# Patient Record
Sex: MALE | Race: WHITE | NOT HISPANIC OR LATINO | Employment: UNEMPLOYED | RURAL
[De-identification: names, ages, dates, MRNs, and addresses within clinical notes are randomized per-mention and may not be internally consistent; named-entity substitution may affect disease eponyms.]

---

## 2022-03-13 ENCOUNTER — HOSPITAL ENCOUNTER (EMERGENCY)
Facility: HOSPITAL | Age: 72
Discharge: OTHER INSTITUTION | End: 2022-03-13
Payer: COMMERCIAL

## 2022-03-13 VITALS
BODY MASS INDEX: 24.43 KG/M2 | DIASTOLIC BLOOD PRESSURE: 73 MMHG | SYSTOLIC BLOOD PRESSURE: 136 MMHG | WEIGHT: 152 LBS | HEART RATE: 74 BPM | TEMPERATURE: 98 F | RESPIRATION RATE: 16 BRPM | OXYGEN SATURATION: 99 % | HEIGHT: 66 IN

## 2022-03-13 DIAGNOSIS — I69.398 ALTERATION OF SENSATION AS LATE EFFECT OF CEREBROVASCULAR ACCIDENT (CVA): ICD-10-CM

## 2022-03-13 DIAGNOSIS — I63.9 CVA (CEREBRAL VASCULAR ACCIDENT): Primary | ICD-10-CM

## 2022-03-13 DIAGNOSIS — R20.9 ALTERATION OF SENSATION AS LATE EFFECT OF CEREBROVASCULAR ACCIDENT (CVA): ICD-10-CM

## 2022-03-13 LAB
ANION GAP SERPL CALCULATED.3IONS-SCNC: 13 MMOL/L (ref 7–16)
BASOPHILS # BLD AUTO: 0.01 K/UL (ref 0–0.2)
BASOPHILS NFR BLD AUTO: 0.1 % (ref 0–1)
BUN SERPL-MCNC: 29 MG/DL (ref 7–18)
BUN/CREAT SERPL: 22 (ref 6–20)
CALCIUM SERPL-MCNC: 7.9 MG/DL (ref 8.5–10.1)
CHLORIDE SERPL-SCNC: 100 MMOL/L (ref 98–107)
CO2 SERPL-SCNC: 27 MMOL/L (ref 21–32)
CREAT SERPL-MCNC: 1.29 MG/DL (ref 0.7–1.3)
DIFFERENTIAL METHOD BLD: ABNORMAL
EOSINOPHIL # BLD AUTO: 0.15 K/UL (ref 0–0.5)
EOSINOPHIL NFR BLD AUTO: 2.1 % (ref 1–4)
ERYTHROCYTE [DISTWIDTH] IN BLOOD BY AUTOMATED COUNT: 16.1 % (ref 11.5–14.5)
GLUCOSE SERPL-MCNC: 92 MG/DL (ref 74–106)
HCT VFR BLD AUTO: 44 % (ref 40–54)
HGB BLD-MCNC: 14.4 G/DL (ref 13.5–18)
INR BLD: 1.05 (ref 0.9–1.1)
LYMPHOCYTES # BLD AUTO: 0.56 K/UL (ref 1–4.8)
LYMPHOCYTES NFR BLD AUTO: 7.9 % (ref 27–41)
MCH RBC QN AUTO: 27.3 PG (ref 27–31)
MCHC RBC AUTO-ENTMCNC: 32.7 G/DL (ref 32–36)
MCV RBC AUTO: 83.3 FL (ref 80–96)
MONOCYTES # BLD AUTO: 0.27 K/UL (ref 0–0.8)
MONOCYTES NFR BLD AUTO: 3.8 % (ref 2–6)
MPC BLD CALC-MCNC: 9.9 FL (ref 9.4–12.4)
NEUTROPHILS # BLD AUTO: 6.09 K/UL (ref 1.8–7.7)
NEUTROPHILS NFR BLD AUTO: 86.1 % (ref 53–65)
PLATELET # BLD AUTO: 240 K/UL (ref 150–400)
POTASSIUM SERPL-SCNC: 4 MMOL/L (ref 3.5–5.1)
PROTHROMBIN TIME: 13.8 SECONDS (ref 11.7–14.7)
RBC # BLD AUTO: 5.28 M/UL (ref 4.6–6.2)
SODIUM SERPL-SCNC: 136 MMOL/L (ref 136–145)
WBC # BLD AUTO: 7.08 K/UL (ref 4.5–11)

## 2022-03-13 PROCEDURE — 36415 COLL VENOUS BLD VENIPUNCTURE: CPT | Performed by: NURSE PRACTITIONER

## 2022-03-13 PROCEDURE — 25000003 PHARM REV CODE 250: Performed by: NURSE PRACTITIONER

## 2022-03-13 PROCEDURE — 93010 EKG 12-LEAD: ICD-10-PCS | Mod: ,,, | Performed by: INTERNAL MEDICINE

## 2022-03-13 PROCEDURE — 99285 EMERGENCY DEPT VISIT HI MDM: CPT | Mod: 25

## 2022-03-13 PROCEDURE — 93010 ELECTROCARDIOGRAM REPORT: CPT | Mod: ,,, | Performed by: INTERNAL MEDICINE

## 2022-03-13 PROCEDURE — 93005 ELECTROCARDIOGRAM TRACING: CPT

## 2022-03-13 PROCEDURE — 99285 EMERGENCY DEPT VISIT HI MDM: CPT | Mod: GF | Performed by: NURSE PRACTITIONER

## 2022-03-13 PROCEDURE — 80048 BASIC METABOLIC PNL TOTAL CA: CPT | Performed by: NURSE PRACTITIONER

## 2022-03-13 PROCEDURE — 85610 PROTHROMBIN TIME: CPT | Performed by: NURSE PRACTITIONER

## 2022-03-13 PROCEDURE — 85025 COMPLETE CBC W/AUTO DIFF WBC: CPT | Performed by: NURSE PRACTITIONER

## 2022-03-13 RX ORDER — DULOXETIN HYDROCHLORIDE 30 MG/1
30 CAPSULE, DELAYED RELEASE ORAL EVERY MORNING
COMMUNITY
Start: 2022-03-10

## 2022-03-13 RX ORDER — ASPIRIN 325 MG
325 TABLET ORAL
Status: COMPLETED | OUTPATIENT
Start: 2022-03-13 | End: 2022-03-13

## 2022-03-13 RX ORDER — GABAPENTIN 300 MG/1
300 CAPSULE ORAL 2 TIMES DAILY
COMMUNITY
Start: 2022-03-02

## 2022-03-13 RX ORDER — AMLODIPINE BESYLATE 10 MG/1
10 TABLET ORAL DAILY
COMMUNITY
Start: 2021-12-29 | End: 2023-02-07 | Stop reason: DRUGHIGH

## 2022-03-13 RX ORDER — TAMSULOSIN HYDROCHLORIDE 0.4 MG/1
1 CAPSULE ORAL NIGHTLY
COMMUNITY
Start: 2022-02-21

## 2022-03-13 RX ORDER — CLOPIDOGREL BISULFATE 75 MG/1
75 TABLET ORAL DAILY
COMMUNITY
Start: 2022-01-11

## 2022-03-13 RX ORDER — ATORVASTATIN CALCIUM 80 MG/1
80 TABLET, FILM COATED ORAL
Status: COMPLETED | OUTPATIENT
Start: 2022-03-13 | End: 2022-03-13

## 2022-03-13 RX ORDER — LOSARTAN POTASSIUM 50 MG/1
50 TABLET ORAL DAILY
COMMUNITY
Start: 2021-12-31

## 2022-03-13 RX ORDER — UMECLIDINIUM BROMIDE AND VILANTEROL TRIFENATATE 62.5; 25 UG/1; UG/1
1 POWDER RESPIRATORY (INHALATION) DAILY
COMMUNITY
Start: 2022-02-28

## 2022-03-13 RX ORDER — LACOSAMIDE 100 MG/1
100 TABLET, FILM COATED ORAL 2 TIMES DAILY
COMMUNITY
Start: 2021-12-21 | End: 2023-02-07 | Stop reason: DRUGHIGH

## 2022-03-13 RX ORDER — OMEPRAZOLE 20 MG/1
20 CAPSULE, DELAYED RELEASE ORAL DAILY
COMMUNITY
Start: 2022-03-03

## 2022-03-13 RX ADMIN — ASPIRIN 325 MG ORAL TABLET 325 MG: 325 PILL ORAL at 01:03

## 2022-03-13 RX ADMIN — ATORVASTATIN CALCIUM 80 MG: 80 TABLET, FILM COATED ORAL at 01:03

## 2022-03-13 NOTE — ED PROVIDER NOTES
Encounter Date: 3/13/2022       History     Chief Complaint   Patient presents with    Back Pain    Leg Pain    Tingling     Pt c/o chronic leg pain, and lower back pain, also reports numbness on right side after waking this AM, reports numbness has almost subsided but still feels a tingling sensation in right hand and shoulder, reports he went to bed at 2000 last PM     70 y/o WM presents pov with c/o chronic low back/leg pain. Also c/o right side numbness/tingling this AM. Previous CVA 4 years ago affecting the right side with no residual deficit.        Review of patient's allergies indicates:  No Known Allergies  Past Medical History:   Diagnosis Date    Anticoagulant long-term use     Arthritis     Coronary artery disease     Hypertension     Stroke      History reviewed. No pertinent surgical history.  History reviewed. No pertinent family history.  Social History     Tobacco Use    Smoking status: Current Every Day Smoker     Packs/day: 1.00     Years: 60.00     Pack years: 60.00     Types: Cigarettes    Smokeless tobacco: Never Used   Substance Use Topics    Alcohol use: Yes     Comment: socially    Drug use: Never     Review of Systems   Constitutional: Negative.    HENT: Negative.    Eyes: Negative.    Respiratory: Negative for apnea, cough, choking, chest tightness, shortness of breath, wheezing and stridor.    Cardiovascular: Negative for chest pain, palpitations and leg swelling.   Gastrointestinal: Negative.    Endocrine: Negative.    Genitourinary: Negative.    Musculoskeletal: Negative.    Skin: Negative.    Neurological: Positive for numbness. Negative for dizziness, tremors, seizures, syncope, facial asymmetry, speech difficulty, weakness, light-headedness and headaches.   Psychiatric/Behavioral: Negative.        Physical Exam     Initial Vitals [03/13/22 1119]   BP Pulse Resp Temp SpO2   131/69 83 18 98.2 °F (36.8 °C) 98 %      MAP       --         Physical Exam    Constitutional: He  appears well-developed and well-nourished.   HENT:   Head: Normocephalic and atraumatic.   Eyes: Conjunctivae and EOM are normal. Pupils are equal, round, and reactive to light.   Neck: Neck supple.   Normal range of motion.  Cardiovascular: Normal rate, regular rhythm, normal heart sounds and intact distal pulses. Exam reveals no gallop and no friction rub.    No murmur heard.  Pulmonary/Chest: Breath sounds normal. No respiratory distress. He has no wheezes. He has no rhonchi. He has no rales. He exhibits no tenderness.   Musculoskeletal:      Cervical back: Normal range of motion and neck supple.     Lymphadenopathy:     He has no cervical adenopathy.   Neurological: He is alert and oriented to person, place, and time. He has normal strength. A sensory deficit is present. No cranial nerve deficit. He displays a negative Romberg sign. GCS eye subscore is 4. GCS verbal subscore is 5. GCS motor subscore is 6.         Medical Screening Exam   See Full Note    ED Course   Procedures  Labs Reviewed   BASIC METABOLIC PANEL - Abnormal; Notable for the following components:       Result Value    BUN 29 (*)     BUN/Creatinine Ratio 22 (*)     Calcium 7.9 (*)     eGFR 58 (*)     All other components within normal limits   CBC WITH DIFFERENTIAL - Abnormal; Notable for the following components:    RDW 16.1 (*)     Neutrophils % 86.1 (*)     Lymphocytes % 7.9 (*)     Lymphocytes, Absolute 0.56 (*)     All other components within normal limits   PROTIME-INR - Normal   CBC W/ AUTO DIFFERENTIAL    Narrative:     The following orders were created for panel order CBC auto differential.  Procedure                               Abnormality         Status                     ---------                               -----------         ------                     CBC with Differential[[id number]]        Abnormal            Final result               Manual Differential[[id number]]                              Final result                  Please view results for these tests on the individual orders.   MANUAL DIFFERENTIAL     EKG Readings: (Independently Interpreted)   Rhythm: Normal Sinus Rhythm. Heart Rate: 75. Ectopy: PACs. ST Segments: Normal ST Segments. T Waves: Normal. Axis: Left Axis Deviation. Clinical Impression: Normal Sinus Rhythm with PACs     ECG Results          EKG 12-lead (In process)  Result time 03/13/22 13:35:03    In process by Interface, Lab In [facility] (03/13/22 13:35:03)                 Narrative:    Test Reason : I63.9,    Vent. Rate : 075 BPM     Atrial Rate : 075 BPM     P-R Int : 162 ms          QRS Dur : 070 ms      QT Int : 374 ms       P-R-T Axes : 026 -43 054 degrees     QTc Int : 417 ms    Sinus rhythm with Premature atrial complexes  Left axis deviation  Abnormal ECG  No previous ECGs available    Referred By: [email or web address]   SELF           Confirmed By:                   In process by Interface, Lab In [facility] (03/13/22 13:33:45)                 Narrative:    Test Reason : I63.9,    Vent. Rate : 075 BPM     Atrial Rate : 075 BPM     P-R Int : 162 ms          QRS Dur : 070 ms      QT Int : 374 ms       P-R-T Axes : 026 -43 054 degrees     QTc Int : 417 ms    Sinus rhythm with Premature atrial complexes  Left axis deviation  Abnormal ECG  No previous ECGs available    Referred By: [email or web address]   SELF           Confirmed By:                             Imaging Results          CT Head Without Contrast (Final result)  Result time 03/13/22 13:08:17    Final result by Andrea Rivera MD (03/13/22 13:08:17)                 Impression:      1. No acute intracranial hemorrhage.    2. Focal hypodensity left centrum semiovale and periventricular white matter of the left frontal lobe may represent focal sequela of chronic microvascular ischemia or superimposed acute lacunar infarct.  Correlate with deficits and consider further evaluation with MRI imaging of the brain when clinically feasible.    Place of service: [facility]      Electronically signed by: Andrea Rivera  Date:    03/13/2022  Time:    13:08             Narrative:    EXAMINATION:  CT HEAD WITHOUT CONTRAST    CLINICAL HISTORY:  Neuro deficit, acute, stroke suspected;    TECHNIQUE:  Axial CT imaging from the vertex to skull the skull base was performed without contrast. Total DLP: 797 mGy*cm    Dose reduction:    The CT exam was performed using one or more dose reduction techniques: Automatic exposure control, automated adjustment of the MA and/or kVP according to patient size, or use of iterative reconstruction technique.    COMPARISON:  None.    FINDINGS:  Atrophy is noted with prominence of the sulci and ventricles.  The basilar cisterns are within normal limits in appearance. There is no evidence of hydrocephalus, midline shift or mass effect.  Somewhat focal hypodensities noted within the left centrum semiovale and periventricular white matter of the frontal lobe which may represent acute focal lacunar infarct/sequela of microvascular ischemia.  Partially imaged probable otherwise, periventricular hypodensity changes are noted bilaterally, which are nonspecific and favored to represent sequela of chronic microvascular ischemia.  The gray and white matter differentiation is otherwise generally preserved.  There is no CT evidence of acute intracranial hemorrhage or infarction.    The calvarium is intact. The visualized orbits and globes appear within normal limits.  Mucous retention cyst of left maxillary sinus is noted.  Otherwise, the paranasal sinuses and mastoid air cells are predominantly clear. Scalp soft tissues appear unremarkable.                                 Medications   aspirin tablet 325 mg (325 mg Oral Given 3/13/22 1330)   atorvastatin tablet 80 mg (80 mg Oral Given 3/13/22 1330)                 ED Course as of 03/13/22 1434   Sun Mar 13, 2022   1200 Patient NIH Score - 2 due to numbness right arm/leg and unable to identify month. [[place]]   1300  Consulted with Dr. Benavides (Vascular neurologist) at [facility] who identifies subacute CVA on CT of Head. Dr. Benavides suggest transfer for further workup: MRI, echocardiagram, carotid ultrasound. [[place]]   1404 Received call back from [facility] Transfer service and spoke with representative contacting [facility]. [facility] declines patient transfer. [[place]]   1418 Received call back from [facility] Transfer service and connected with Dr. Viera (neurology) at [facility] who agrees to see patient. Dr. Arnaldo Nunez ([facility] ED) will be the accepting physician. [[place]]      ED Course User Index  [[place]] LIAM Mitchell          Clinical Impression:   Final diagnoses:  [I63.9] CVA (cerebral vascular accident) (Primary)  [I69.398, R20.9] Alteration of sensation as late effect of cerebrovascular accident (CVA)          ED Disposition Condition    ED to ED Transfer               LIAM Mitchell  03/13/22 1433       LIAM Mitchell  03/13/22 1434

## 2022-03-13 NOTE — ED NOTES
Pt care dispatch notified pt ready for transfer. Stated they had all units out on 911 calls at time,and they would send a unit  ASAP.

## 2022-04-25 PROBLEM — I63.412 STROKE DUE TO EMBOLISM OF LEFT MIDDLE CEREBRAL ARTERY: Status: ACTIVE | Noted: 2022-04-25

## 2022-04-25 NOTE — HPI
This is a 71-year-old male who was last known normal at 20 100 on 03/12/2022 patient awakened with mild right hemiparesis and decreased sensation to the right arm and leg.  Patient had a stroke 4 years ago.

## 2022-04-25 NOTE — ASSESSMENT & PLAN NOTE
Stroke due to embolism of left middle cerebral artery  Antithrombotics for secondary stroke prevention: Antiplatelets: Aspirin: 81 mg daily  Clopidogrel: 75 mg daily    Statins for secondary stroke prevention and hyperlipidemia, if present:   Statins: Atorvastatin- 40 mg daily the the the    Aggressive risk factor modification: HTN     Rehab efforts: The patient has been evaluated by a stroke team provider and the therapy needs have been fully considered based off the presenting complaints and exam findings. The following therapy evaluations are needed: PT evaluate and treat, OT evaluate and treat    Diagnostics ordered/pending: Lipid Profile to assess cholesterol levels, MRA head to assess vasculature, MRA neck/arch to assess vasculature, MRI head without contrast to assess brain parenchyma, TTE to assess cardiac function/status     VTE prophylaxis: None: Reason for No Pharmacological VTE Prophylaxis: Ambulating with or without assistance    BP parameters: Infarct: No intervention, SBP <220

## 2022-04-25 NOTE — CONSULTS
[facility]  Vascular Neurology  Comprehensive Stroke Center  TeleVascular Neurology Acute Consultation Note      Consults    Consulting Provider: UMA PARKER  Current Providers  No providers found    Patient Location:  [facility] EMERGENCY DEPART* Emergency Department  Spoke hospital nurse at bedside with patient assisting consultant.     Patient information was obtained from patient.         Assessment/Plan:       Diagnoses:   Stroke due to embolism of left middle cerebral artery  Stroke due to embolism of left middle cerebral artery  Antithrombotics for secondary stroke prevention: Antiplatelets: Aspirin: 81 mg daily  Clopidogrel: 75 mg daily    Statins for secondary stroke prevention and hyperlipidemia, if present:   Statins: Atorvastatin- 40 mg daily the the the    Aggressive risk factor modification: HTN     Rehab efforts: The patient has been evaluated by a stroke team provider and the therapy needs have been fully considered based off the presenting complaints and exam findings. The following therapy evaluations are needed: PT evaluate and treat, OT evaluate and treat    Diagnostics ordered/pending: Lipid Profile to assess cholesterol levels, MRA head to assess vasculature, MRA neck/arch to assess vasculature, MRI head without contrast to assess brain parenchyma, TTE to assess cardiac function/status     VTE prophylaxis: None: Reason for No Pharmacological VTE Prophylaxis: Ambulating with or without assistance    BP parameters: Infarct: No intervention, SBP <220            STROKE DOCUMENTATION     Acute Stroke Times:   Acute Stroke Times   Last Known Normal Date: 04/12/22  Last Known Normal Time: 2000  Symptom Onset Date: 04/12/22  Symptom Onset Time: 2000  Stroke Team Called Date: 04/13/22  Stroke Team Called Time: 1246  Stroke Team Arrival Date: 04/13/22  Stroke Team Arrival Time: 1251  CT Interpretation Time: 1251  Alteplase Recommended: No  Thrombectomy  "Recommended: No    NIH Scale:        Modified [place]    [place]ley Coma Scale:    ABCD2 Score:    [id number] Score:   HAS -BLED Score:   ICH Score:   Hunt & Benavides Classification:       Blood pressure 136/73, pulse 74, temperature 98.2 °F (36.8 °C), temperature source Oral, resp. rate 16, height 5' 6" (1.676 m), weight 68.9 kg (152 lb), SpO2 99 %.  Alteplase Eligible?: No  Alteplase Recommendation: Alteplase not recommended due to Outside of treatment window   Possible Interventional Revascularization Candidate? No; at this time symptoms not suggestive of large vessel occlusion and No; significant pre-stroke disability     Disposition Recommendation: transfer to nearest appropriate facility     Subjective:     History of Present Illness:  This is a 71-year-old male who was last known normal at 20 100 on 03/12/2022 patient awakened with mild right hemiparesis and decreased sensation to the right arm and leg.  Patient had a stroke 4 years ago.        Woke up with symptoms?: yes    Recent bleeding noted: no  Does the patient take any Blood Thinners? no  Medications: No relevant medications      Past Medical History: stroke    Past Surgical History: no major surgeries within the last 2 weeks    Family History: no relevant history    Social History: no smoking, no drinking, no drugs    Allergies: No Known Allergies     Review of Systems   Constitutional: Negative for chills and fever.   HENT: Negative for congestion and sore throat.    Eyes: Negative for visual disturbance.   Respiratory: Negative for shortness of breath.    Cardiovascular: Negative for chest pain and palpitations.   Gastrointestinal: Negative for blood in stool, diarrhea, nausea and vomiting.   Genitourinary: Negative for difficulty urinating and hematuria.   Musculoskeletal: Negative for back pain and neck pain.   Neurological: Positive for weakness and numbness. Negative for dizziness, speech difficulty and headaches.     Objective:   Vitals: Blood " "pressure 136/73, pulse 74, temperature 98.2 °F (36.8 °C), temperature source Oral, resp. rate 16, height 5' 6" (1.676 m), weight 68.9 kg (152 lb), SpO2 99 %.     CT READ: Yes  Abnormal CT Left basal ganglia infarct.     Physical Exam  Vitals reviewed.   Constitutional:       Appearance: Normal appearance. He is well-developed.   HENT:      Head: Normocephalic and atraumatic.      Nose: Nose normal.   Eyes:      Pupils: Pupils are equal, round, and reactive to light.   Cardiovascular:      Rate and Rhythm: Normal rate and regular rhythm.   Pulmonary:      Effort: Pulmonary effort is normal.   Neurological:      Mental Status: He is alert and oriented to person, place, and time.      Cranial Nerves: Cranial nerve deficit and facial asymmetry present.      Sensory: Sensory deficit present.      Motor: Weakness present.      Comments: Right hemiparesis with faviola sensory loss   Psychiatric:         Mood and Affect: Mood normal.               Recommended the emergency room physician to have a brief discussion with the patient and/or family if available regarding the  risks and benefits of treatment, and to briefly document the occurrence of that discussion in his clinical encounter note.     The attending portion of this evaluation, treatment, and documentation was performed per Mercy Benavides MD via audiovisual.    Billing code:  (non-intervention mild to moderate stroke, TIA, some mimics)    · This patient has a critical neurological condition/illness, with some potential for high morbidity and mortality.  · There is a moderate probability for acute neurological change leading to clinical and possibly life-threatening deterioration requiring highest level of physician preparedness for urgent intervention.  · Care was coordinated with other physicians involved in the patient's care.  · Radiologic studies and laboratory data were reviewed and interpreted, and plan of care was re-assessed based on the " results.  · Diagnosis, treatment options and prognosis may have been discussed with the patient and/or family members or caregiver.      In your opinion, this was a: Tier 2 Van Negative    Consult End Time: 1310     Mercy Benavides MD  [facility]  Vascular Neurology   [facility]

## 2022-04-25 NOTE — SUBJECTIVE & OBJECTIVE
"  Woke up with symptoms?: yes    Recent bleeding noted: no  Does the patient take any Blood Thinners? no  Medications: No relevant medications      Past Medical History: stroke    Past Surgical History: no major surgeries within the last 2 weeks    Family History: no relevant history    Social History: no smoking, no drinking, no drugs    Allergies: No Known Allergies     Review of Systems   Constitutional: Negative for chills and fever.   HENT: Negative for congestion and sore throat.    Eyes: Negative for visual disturbance.   Respiratory: Negative for shortness of breath.    Cardiovascular: Negative for chest pain and palpitations.   Gastrointestinal: Negative for blood in stool, diarrhea, nausea and vomiting.   Genitourinary: Negative for difficulty urinating and hematuria.   Musculoskeletal: Negative for back pain and neck pain.   Neurological: Positive for weakness and numbness. Negative for dizziness, speech difficulty and headaches.     Objective:   Vitals: Blood pressure 136/73, pulse 74, temperature 98.2 °F (36.8 °C), temperature source Oral, resp. rate 16, height 5' 6" (1.676 m), weight 68.9 kg (152 lb), SpO2 99 %.     CT READ: Yes  Abnormal CT Left basal ganglia infarct.     Physical Exam  Vitals reviewed.   Constitutional:       Appearance: Normal appearance. He is well-developed.   HENT:      Head: Normocephalic and atraumatic.      Nose: Nose normal.   Eyes:      Pupils: Pupils are equal, round, and reactive to light.   Cardiovascular:      Rate and Rhythm: Normal rate and regular rhythm.   Pulmonary:      Effort: Pulmonary effort is normal.   Neurological:      Mental Status: He is alert and oriented to person, place, and time.      Cranial Nerves: Cranial nerve deficit and facial asymmetry present.      Sensory: Sensory deficit present.      Motor: Weakness present.      Comments: Right hemiparesis with faviola sensory loss   Psychiatric:         Mood and Affect: Mood normal.           "

## 2023-02-07 ENCOUNTER — HOSPITAL ENCOUNTER (EMERGENCY)
Facility: HOSPITAL | Age: 73
Discharge: HOME | End: 2023-02-07
Payer: COMMERCIAL

## 2023-02-07 VITALS
DIASTOLIC BLOOD PRESSURE: 71 MMHG | HEART RATE: 82 BPM | RESPIRATION RATE: 18 BRPM | BODY MASS INDEX: 24.11 KG/M2 | OXYGEN SATURATION: 98 % | TEMPERATURE: 98 F | WEIGHT: 150 LBS | HEIGHT: 66 IN | SYSTOLIC BLOOD PRESSURE: 149 MMHG

## 2023-02-07 DIAGNOSIS — M54.9 ACUTE MIDLINE BACK PAIN, UNSPECIFIED BACK LOCATION: Primary | ICD-10-CM

## 2023-02-07 PROCEDURE — 99284 EMERGENCY DEPT VISIT MOD MDM: CPT | Mod: EDII,,, | Performed by: NURSE PRACTITIONER

## 2023-02-07 PROCEDURE — 99284 EMERGENCY DEPT VISIT MOD MDM: CPT | Mod: GF,25

## 2023-02-07 PROCEDURE — 63600175 PHARM REV CODE 636 W HCPCS: Performed by: NURSE PRACTITIONER

## 2023-02-07 PROCEDURE — 99284 PR EMERGENCY DEPT VISIT,LEVEL IV: ICD-10-PCS | Mod: EDII,,, | Performed by: NURSE PRACTITIONER

## 2023-02-07 PROCEDURE — 96372 THER/PROPH/DIAG INJ SC/IM: CPT | Performed by: NURSE PRACTITIONER

## 2023-02-07 PROCEDURE — 99285 EMERGENCY DEPT VISIT HI MDM: CPT | Mod: 25

## 2023-02-07 RX ORDER — LACOSAMIDE 150 MG/1
150 TABLET ORAL 2 TIMES DAILY
COMMUNITY
Start: 2023-02-02

## 2023-02-07 RX ORDER — ATORVASTATIN CALCIUM 80 MG/1
80 TABLET, FILM COATED ORAL NIGHTLY
COMMUNITY
Start: 2022-11-08

## 2023-02-07 RX ORDER — UMECLIDINIUM 62.5 UG/1
AEROSOL, POWDER ORAL
COMMUNITY
Start: 2023-02-02

## 2023-02-07 RX ORDER — MIRTAZAPINE 7.5 MG/1
7.5 TABLET, FILM COATED ORAL NIGHTLY
COMMUNITY
Start: 2022-12-31

## 2023-02-07 RX ORDER — KETOROLAC TROMETHAMINE 30 MG/ML
30 INJECTION, SOLUTION INTRAMUSCULAR; INTRAVENOUS
Status: COMPLETED | OUTPATIENT
Start: 2023-02-07 | End: 2023-02-07

## 2023-02-07 RX ORDER — HYDROXYZINE HYDROCHLORIDE 50 MG/1
50 TABLET, FILM COATED ORAL 3 TIMES DAILY
COMMUNITY
Start: 2023-02-01

## 2023-02-07 RX ORDER — NAPROXEN SODIUM 220 MG/1
81 TABLET, FILM COATED ORAL
COMMUNITY
Start: 2022-11-15

## 2023-02-07 RX ADMIN — KETOROLAC TROMETHAMINE 30 MG: 30 INJECTION, SOLUTION INTRAMUSCULAR at 04:02

## 2023-02-07 NOTE — ED NOTES
Pt clothes and underwear saturated in urine. Changed pt clothes. Placed in a gown and clean, dry brief. Linens changed. Repositioned for comfort.

## 2023-02-07 NOTE — ED NOTES
Called pt daughter, Sarahi and informed of pt discharge and need for transportation home. States she is coming to pick him up.
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
## 2023-02-07 NOTE — ED NOTES
Spoke to pt's daughter, Sarahi. #[phone]. States pt lives with her at her home. Reports compliance with seizure medications. States that he was seen at OSH on Saturday and had CT scans done. Was discharged home. Since discharge, pt still c/o back pain.

## 2024-05-02 ENCOUNTER — HOSPITAL ENCOUNTER (EMERGENCY)
Facility: HOSPITAL | Age: 74
Discharge: HOME | End: 2024-05-02
Payer: COMMERCIAL

## 2024-05-02 VITALS
OXYGEN SATURATION: 96 % | HEART RATE: 101 BPM | RESPIRATION RATE: 20 BRPM | SYSTOLIC BLOOD PRESSURE: 158 MMHG | WEIGHT: 128.13 LBS | DIASTOLIC BLOOD PRESSURE: 80 MMHG | HEIGHT: 62 IN | BODY MASS INDEX: 23.58 KG/M2 | TEMPERATURE: 99 F

## 2024-05-02 DIAGNOSIS — R13.10 DYSPHAGIA: Primary | ICD-10-CM

## 2024-05-02 PROCEDURE — 96374 THER/PROPH/DIAG INJ IV PUSH: CPT

## 2024-05-02 PROCEDURE — 63600175 PHARM REV CODE 636 W HCPCS: Mod: JZ,TB | Performed by: NURSE PRACTITIONER

## 2024-05-02 PROCEDURE — 99284 EMERGENCY DEPT VISIT MOD MDM: CPT | Mod: 25

## 2024-05-02 PROCEDURE — 99284 EMERGENCY DEPT VISIT MOD MDM: CPT | Mod: GF | Performed by: NURSE PRACTITIONER

## 2024-05-02 RX ORDER — GLUCAGON 1 MG
1 KIT INJECTION
Status: COMPLETED | OUTPATIENT
Start: 2024-05-02 | End: 2024-05-02

## 2024-05-02 RX ORDER — DICLOFENAC SODIUM 10 MG/G
GEL TOPICAL
COMMUNITY
Start: 2024-03-28

## 2024-05-02 RX ORDER — AMLODIPINE BESYLATE 5 MG/1
5 TABLET ORAL
COMMUNITY
Start: 2024-03-28

## 2024-05-02 RX ORDER — PROPRANOLOL HYDROCHLORIDE 60 MG/1
60 CAPSULE, EXTENDED RELEASE ORAL
COMMUNITY

## 2024-05-02 RX ADMIN — Medication 1 MG: at 08:05

## 2024-05-02 NOTE — ED NOTES
Informed LASHONDA Waters about ED discharge put in for transfer for psych facility. Discharge was placed incorrectly. LASHONDA Waters adjusted discharge. Pt will follow-up at [facility].

## 2024-05-02 NOTE — ED NOTES
Pt presents to ED ambulatory with walking stick c/o trouble swallowing after belching up a piece of meat from supper last night. Pt is able to maintain own airway and control own secretions. Speaking in full, clear sentences. Emesis bag provided to spit as needed. Able to cough but unable to cough up object. No drooling noted.     AAOx4. GCS 15. Poor historian.   Continuous monitoring in place.   No acute distress noted. Plan of care reviewed.   Will continue to monitor for clinical changes.

## 2024-05-02 NOTE — ED NOTES
Health Maintenance Due   Topic Date Due   • Lung Cancer Screening  Never done   • Shingles Vaccine (1 of 2) Never done   • DTaP/Tdap/Td Vaccine (1 - Tdap) 04/09/2008   • Abdominal Aortic Aneurysm (AAA) Screening  Never done   • Pneumococcal Vaccine 65+ (3 - PPSV23 or PCV20) 12/28/2021   • Medicare Advantage- Medicare Wellness Visit  Never done   • COVID-19 Vaccine (4 - Booster for Pfizer series) 04/21/2022   • Colorectal Cancer Screen-  07/25/2022   • Depression Screening  01/12/2023       Patient is due for topics as listed above but is not proceeding with Immunization(s) COVID-19, Dtap/Tdap/Td and Shingles at this time. Orders placed for Immunization(s) Pneumococcal. Education provided for Colorectal Cancer Screening: Colonoscopy and Lung Cancer Screening.     Pt unable to tolerate PO intake.

## 2024-05-02 NOTE — ED PROVIDER NOTES
"Encounter Date: 5/2/2024       History     Chief Complaint   Patient presents with    Foreign Body In Throat     States he "belched and something got caught in his throat".     74 y/o WM presents pov with c/o having difficulty swallowing after eating last night. Patient states he is having difficulty swallowing his own :spit" and thinks he may have a meat bolus stuck in his throat from the pork he ate last night. He denies any throat pain and states "it just feels like something is stuck in my throat."    The history is provided by the patient.     Review of patient's allergies indicates:  No Known Allergies  Past Medical History:   Diagnosis Date    Anticoagulant long-term use     Arthritis     Coronary artery disease     Hypertension     Seizures     Stroke      No past surgical history on file.  No family history on file.  Social History     Tobacco Use    Smoking status: Every Day     Current packs/day: 1.00     Average packs/day: 1 pack/day for 60.0 years (60.0 ttl pk-yrs)     Types: Cigarettes    Smokeless tobacco: Never   Substance Use Topics    Alcohol use: Yes     Comment: socially    Drug use: Never     Review of Systems   Constitutional:  Negative for chills and fever.   HENT:  Positive for trouble swallowing. Negative for congestion, drooling, postnasal drip, rhinorrhea, sore throat and voice change.    Respiratory: Negative.  Negative for apnea, cough, choking, chest tightness, shortness of breath, wheezing and stridor.    Cardiovascular: Negative.  Negative for chest pain, palpitations and leg swelling.   Gastrointestinal: Negative.    Musculoskeletal: Negative.    Skin: Negative.    Neurological: Negative.    Psychiatric/Behavioral: Negative.     All other systems reviewed and are negative.      Physical Exam     Initial Vitals [05/02/24 0733]   BP Pulse Resp Temp SpO2   (!) 158/80 101 20 99.2 °F (37.3 °C) 96 %      MAP       --         Physical Exam    Nursing note and vitals " reviewed.  Constitutional: He appears well-developed and well-nourished. No distress.   HENT:   Head: Normocephalic.   Mouth/Throat: Uvula is midline, oropharynx is clear and moist and mucous membranes are normal.   Eyes: Conjunctivae and EOM are normal.   Neck: Trachea normal. Neck supple. No thyroid mass and no thyromegaly present. No stridor present.   Normal range of motion.   Full passive range of motion without pain.     Cardiovascular:  Normal rate, regular rhythm, normal heart sounds and intact distal pulses.     Exam reveals no gallop and no friction rub.       No murmur heard.  Pulmonary/Chest: Breath sounds normal. No respiratory distress. He has no wheezes. He has no rhonchi. He has no rales. He exhibits no tenderness.   Abdominal: Abdomen is soft. Bowel sounds are normal.   Musculoskeletal:         General: Normal range of motion.      Cervical back: Full passive range of motion without pain, normal range of motion and neck supple.     Neurological: He is alert and oriented to person, place, and time. He has normal strength.   Skin: Skin is warm and dry.   Psychiatric: He has a normal mood and affect. His behavior is normal. Judgment and thought content normal.         Medical Screening Exam   See Full Note    ED Course   Procedures  Labs Reviewed - No data to display       Imaging Results              X-Ray Neck Soft Tissue (Final result)  Result time 05/02/24 07:53:54      Final result by Eduardo Bush MD (05/02/24 07:53:54)                   Impression:      No acute findings by radiograph.      Electronically signed by: Eduardo Bush  Date:    05/02/2024  Time:    07:53               Narrative:    EXAMINATION:  XR NECK SOFT TISSUE    CLINICAL HISTORY:  Dysphagia, unspecified    TECHNIQUE:  AP and lateral soft tissue views the neck were performed.    COMPARISON:  None.    FINDINGS:  Diffuse degenerative changes seen of the cervical spine.  The prevertebral soft tissues appear normal.  Epiglottis  "unremarkable.                                       Medications   glucagon (human recombinant) injection 1 mg (1 mg Intravenous Given 5/2/24 0827)   glucagon (human recombinant) injection 1 mg (1 mg Intravenous Given 5/2/24 0843)     Medical Decision Making  74 y/o WM presents pov with c/o having difficulty swallowing after eating last night. Patient states he is having difficulty swallowing his own :spit" and thinks he may have a meat bolus stuck in his throat from the pork he ate last night. He denies any throat pain and states "it just feels like something is stuck in my throat."    Amount and/or Complexity of Data Reviewed  Radiology: ordered.               ED Course as of 05/02/24 0910   Thu May 02, 2024   0745 Consulted with [facility] to see if Dr. Molina is performing scopes there today. He will be there at 8:30 this morning and nurse states she will have him call me. [NJ]   0750 Consulted with [facility] to enquire about G.I. services. No G.I. services available today. [NJ]   0800 Spoke with nurse at [facility]. Nurse states no G.I. services on-call today.  [NJ]   0805 Received call back from Bari arias nurse at [facility] and she assures me Dr. Molina is on-call for R everyday. Suggest giving patient Glucagon first in attempt to resolve supposed bolus. [NJ]   0835 Glucagon 1mg IV given. [NJ]   0841 Patient given yogurt. Patient having difficulty swallowing. [NJ]   0843 Glucagon 1 mg IV repeated. [NJ]   0850 Received call back from Dr. Mohr at [facility] ED. He confirms that Dr. Molina is on-call for G.I. services today. He suggest trying giving patient a coke to drink and if he is still unable to swallow send him to [facility] ED and Dr. Molina will see patient there. [NJ]   0900 Patient given a coke to drink. Patient unable to tolerate and swallow. [NJ]   0900 Called and notified [facility] ED of plans to send patient for G.I. consult with Dr. Molina. [NJ]      ED Course " User Index  [NJ] Tani Waters FNP                           Clinical Impression:   Final diagnoses:  [R13.10] Dysphagia (Primary)        ED Disposition Condition    Discharge Stable          ED Prescriptions    None       Follow-up Information    None          Tani Waters FNP  05/02/24 0910

## 2024-05-02 NOTE — ED NOTES
Spoke with MD Fani at [facility] and informed us if pt unable to tolerate, that pt could come to their ER where Gi is on call. Pt is in no acute distress at this time. Able to tolerate own secretions.